# Patient Record
Sex: MALE | Race: WHITE | NOT HISPANIC OR LATINO | Employment: FULL TIME | URBAN - METROPOLITAN AREA
[De-identification: names, ages, dates, MRNs, and addresses within clinical notes are randomized per-mention and may not be internally consistent; named-entity substitution may affect disease eponyms.]

---

## 2023-09-03 ENCOUNTER — OFFICE VISIT (OUTPATIENT)
Dept: URGENT CARE | Facility: CLINIC | Age: 33
End: 2023-09-03
Payer: COMMERCIAL

## 2023-09-03 VITALS
WEIGHT: 217 LBS | RESPIRATION RATE: 18 BRPM | BODY MASS INDEX: 28.76 KG/M2 | DIASTOLIC BLOOD PRESSURE: 73 MMHG | TEMPERATURE: 99 F | HEART RATE: 73 BPM | HEIGHT: 73 IN | SYSTOLIC BLOOD PRESSURE: 108 MMHG | OXYGEN SATURATION: 100 %

## 2023-09-03 DIAGNOSIS — R09.81 NASAL CONGESTION: ICD-10-CM

## 2023-09-03 DIAGNOSIS — J02.9 SORE THROAT: ICD-10-CM

## 2023-09-03 DIAGNOSIS — U07.1 COVID: Primary | ICD-10-CM

## 2023-09-03 LAB
CTP QC/QA: YES
CTP QC/QA: YES
MOLECULAR STREP A: NEGATIVE
SARS-COV-2 RDRP RESP QL NAA+PROBE: POSITIVE

## 2023-09-03 PROCEDURE — 99203 OFFICE O/P NEW LOW 30 MIN: CPT | Mod: S$PBB,,, | Performed by: NURSE PRACTITIONER

## 2023-09-03 PROCEDURE — 87651 STREP A DNA AMP PROBE: CPT | Mod: PBBFAC | Performed by: NURSE PRACTITIONER

## 2023-09-03 PROCEDURE — 99204 OFFICE O/P NEW MOD 45 MIN: CPT | Mod: PBBFAC | Performed by: NURSE PRACTITIONER

## 2023-09-03 PROCEDURE — 87635 SARS-COV-2 COVID-19 AMP PRB: CPT | Mod: PBBFAC | Performed by: NURSE PRACTITIONER

## 2023-09-03 PROCEDURE — 99203 PR OFFICE/OUTPT VISIT, NEW, LEVL III, 30-44 MIN: ICD-10-PCS | Mod: S$PBB,,, | Performed by: NURSE PRACTITIONER

## 2023-09-03 RX ORDER — FLUTICASONE PROPIONATE 50 MCG
2 SPRAY, SUSPENSION (ML) NASAL DAILY
Qty: 16 G | Refills: 2 | Status: SHIPPED | OUTPATIENT
Start: 2023-09-03 | End: 2024-01-31

## 2023-09-03 NOTE — PATIENT INSTRUCTIONS
Please see provided patient education for guidance.    I recommend a 7 day quarantine from day of symptom onset.   COVID is contagious for an average of EIGHT DAYS, starting from Day 1 that you have symptoms.  You can spread COVID 2-3 days before you have symptoms.  The [facility] permits 5 days of quarantine. If you choose to quarantine for 5 days, wear a hospital-grade, surgical mask over your nose and mouth when around other people and in public through day 8.  A cloth mask provides no protection from spreading or divine COVID.    Go to the ER if you experience chest pain with shortness of breath, shortness of breath when moving around your house, high fevers 103.0+, excessive vomiting/diarrhea, or general distress.

## 2023-09-03 NOTE — PROGRESS NOTES
"Subjective:      Patient ID: Mauricio Avendaño is a 33 y.o. male.    Vitals:  height is 6' 1" (1.854 m) and weight is 98.4 kg (217 lb). His oral temperature is 98.5 °F (36.9 °C). His blood pressure is 108/73 and his pulse is 73. His respiration is 18 and oxygen saturation is 100%.     Chief Complaint: Nasal Congestion, Sore Throat, Headache, and Generalized Body Aches (Onset 08/28, Tried Ibuprofen, Zyrtec)    Sore Throat   Associated symptoms include headaches.   Headache   Associated symptoms include a sore throat.    as stated in CC. As stated in CC. Denies chest pain, shortness of breath, dizziness abdominal pain n/v/d.     HENT:  Positive for sore throat.    Neurological:  Positive for headaches.      Objective:     Physical Exam   Constitutional: He is oriented to person, place, and time.  Non-toxic appearance. He does not appear ill. No distress.   HENT:   Head: Normocephalic.   Ears:   Right Ear: Tympanic membrane normal.   Left Ear: Tympanic membrane normal.   Nose: Congestion present. No rhinorrhea.   Mouth/Throat: Mucous membranes are moist. Posterior oropharyngeal erythema present. No oropharyngeal exudate.   Eyes: Conjunctivae are normal.   Cardiovascular: Normal rate and normal pulses.   Pulmonary/Chest: Effort normal and breath sounds normal. No stridor. No respiratory distress. He has no wheezes. He has no rhonchi. He has no rales. He exhibits no tenderness.   Abdominal: Normal appearance and bowel sounds are normal. He exhibits no distension and no mass. Soft. There is no abdominal tenderness. There is no rebound, no guarding, no left CVA tenderness and no right CVA tenderness. No hernia.   Musculoskeletal: Normal range of motion.         General: Normal range of motion.   Neurological: no focal deficit. He is alert and oriented to person, place, and time.   Skin: Skin is warm, dry and not diaphoretic. Capillary refill takes less than 2 seconds.   Psychiatric: His behavior is normal. Mood, judgment and " thought content normal.   Nursing note and vitals reviewed.      Assessment:     1. COVID    2. Sore throat    3. Nasal congestion      Results for orders placed or performed in visit on 09/03/23   POCT COVID-19 Rapid Screening   Result Value Ref Range    POC Rapid COVID Positive (A) Negative     Acceptable Yes    POCT Strep A, Molecular   Result Value Ref Range    Molecular Strep A, POC Negative Negative     Acceptable Yes          Plan:   ER Precautions    COVID    Sore throat  -     POCT Strep A, Molecular    Nasal congestion  -     POCT COVID-19 Rapid Screening    Other orders  -     fluticasone propionate (FLONASE) 50 mcg/actuation nasal spray; 2 sprays (100 mcg total) by Each Nostril route once daily.  Dispense: 16 g; Refill: 2

## 2023-09-03 NOTE — LETTER
September 3, 2023      [facility]  [facility] [facility] [address]  Phone: [phone]       Patient: Mauricio Avendaño   YOB: 1990  Date of Visit: 09/03/2023    To Whom It May Concern:    Jennifer Avendaño  was at [facility] on 09/03/2023. The patient may return to work/school on 09/09/2023 with no restrictions. If you have any questions or concerns, or if I can be of further assistance, please do not hesitate to contact me.    Sincerely,      Sarah Aviles NP

## 2024-03-26 PROBLEM — Z00.00 WELLNESS EXAMINATION: Status: ACTIVE | Noted: 2024-03-26
